# Patient Record
(demographics unavailable — no encounter records)

---

## 2025-06-22 NOTE — HISTORY OF PRESENT ILLNESS
[de-identified] : ITCHY RASH ON FACE [FreeTextEntry6] : JOHN GAUTHIER is a 10 year old female presenting for complaints of eczema. She is using tacrolimus which usually helps her with flares.  Dad asking about allergy testing.  [place], was receiving [employer] and is not currently receiving. Father asking for a letter to start [employer]. She is doing well academically but parents feel that she has regression in her language and social skills.  School wants to keep her in a special ed class but parents want her mainstreamed.  She has an IEP in place and receives [facility].  Parents asking about referral to outside ST. Worsening eye contact since she is not getting [employer] services.

## 2025-06-22 NOTE — DISCUSSION/SUMMARY
[FreeTextEntry1] : 10 yo here with eczema, concerns for developmental regression related to Autism and need for services and allergies.  Referral to pediatric social work to assist with services for Autistic children. Letter was written today and given to father advocating for [employer] therapy.  Developmental pediatrics referral and allergy referral.  Can use hydrocortisone BID x 5 days to itchy spots on her face. Then stop.  Eczema care was discussed at length with patient/family.  Atopic dermatitis/eczema can be managed with a combination of moisturizing products and topical treatments. Fragrance free, dye free and hypoallergenic formulations of lotions, creams, soaps, and laundry detergent are best for atopic dermatitis. All people that are touching and caring for baby should also avoid scented, fragranced or dyed products as the baby could be in contact through clothing and touch. If topical steroids are prescribed, you can spot treat bothersome area of eczema 2x/day x 7 days.  You can alternate 7 days at a time during eczema flairs with topical steroids and emollients. Brands such as Vaseline, Cerave and Vanicream are appropriate for people with eczema.    Avoid hot water bathing during acute flairs.  Apply lotions, creams and ointments to the skin when wet to maximize absorption. Wear cotton clothing whenever possible. Avoid known triggers.  If eczema is not improving or if the skin appears infected, return for evaluation.

## 2025-06-22 NOTE — HISTORY OF PRESENT ILLNESS
[de-identified] : ITCHY RASH ON FACE [FreeTextEntry6] : JOHN GAUTHIER is a 10 year old female presenting for complaints of eczema. She is using tacrolimus which usually helps her with flares.  Dad asking about allergy testing.  [place], was receiving [employer] and is not currently receiving. Father asking for a letter to start [employer]. She is doing well academically but parents feel that she has regression in her language and social skills.  School wants to keep her in a special ed class but parents want her mainstreamed.  She has an IEP in place and receives [facility].  Parents asking about referral to outside ST. Worsening eye contact since she is not getting [employer] services.

## 2025-06-22 NOTE — PHYSICAL EXAM
[Acute Distress] : no acute distress [NL] : regular rate and rhythm, normal S1, S2 audible, no murmurs [No Abnormal Lymph Nodes Palpated] : no abnormal lymph nodes palpated [Warm] : warm [de-identified] : Generalized xerosis, flexural eczema and mild eczema around the eyes.

## 2025-06-22 NOTE — PHYSICAL EXAM
[Acute Distress] : no acute distress [NL] : regular rate and rhythm, normal S1, S2 audible, no murmurs [No Abnormal Lymph Nodes Palpated] : no abnormal lymph nodes palpated [Warm] : warm [de-identified] : Generalized xerosis, flexural eczema and mild eczema around the eyes.

## 2025-06-22 NOTE — PHYSICAL EXAM
[Acute Distress] : no acute distress [NL] : regular rate and rhythm, normal S1, S2 audible, no murmurs [No Abnormal Lymph Nodes Palpated] : no abnormal lymph nodes palpated [Warm] : warm [de-identified] : Generalized xerosis, flexural eczema and mild eczema around the eyes.

## 2025-06-22 NOTE — HISTORY OF PRESENT ILLNESS
[de-identified] : ITCHY RASH ON FACE [FreeTextEntry6] : JOHN GAUTHIER is a 10 year old female presenting for complaints of eczema. She is using tacrolimus which usually helps her with flares.  Dad asking about allergy testing.  Also [place], was receiving [employer] and is not currently receiving. Father asking for a letter to start [employer]. She is doing well academically but parents feel that she has regression in her language and social skills.  School wants to keep her in a special ed class but parents want her mainstreamed.  She has an IEP in place and receives ST.  Parents asking about referral to outside ST. Worsening eye contact since she is not getting [employer] services.